# Patient Record
Sex: FEMALE | Race: WHITE | NOT HISPANIC OR LATINO | Employment: FULL TIME | URBAN - METROPOLITAN AREA
[De-identification: names, ages, dates, MRNs, and addresses within clinical notes are randomized per-mention and may not be internally consistent; named-entity substitution may affect disease eponyms.]

---

## 2022-11-16 PROBLEM — N92.0 EXCESSIVE OR FREQUENT MENSTRUATION: Status: ACTIVE | Noted: 2022-11-16

## 2022-11-16 PROBLEM — R10.2 PELVIC PAIN IN FEMALE: Status: ACTIVE | Noted: 2022-11-16

## 2024-11-08 ENCOUNTER — PATIENT MESSAGE (OUTPATIENT)
Dept: OTHER | Facility: OTHER | Age: 34
End: 2024-11-08
Payer: COMMERCIAL

## 2025-02-19 ENCOUNTER — PATIENT MESSAGE (OUTPATIENT)
Dept: INTERNAL MEDICINE | Facility: CLINIC | Age: 35
End: 2025-02-19

## 2025-02-19 ENCOUNTER — OFFICE VISIT (OUTPATIENT)
Dept: INTERNAL MEDICINE | Facility: CLINIC | Age: 35
End: 2025-02-19
Payer: COMMERCIAL

## 2025-02-19 DIAGNOSIS — E66.01 MORBID OBESITY WITH BODY MASS INDEX (BMI) GREATER THAN OR EQUAL TO 50: Primary | ICD-10-CM

## 2025-02-19 PROCEDURE — 99499 UNLISTED E&M SERVICE: CPT | Mod: 95,,,

## 2025-02-19 RX ORDER — SEMAGLUTIDE 0.5 MG/.5ML
0.5 INJECTION, SOLUTION SUBCUTANEOUS
Qty: 2 ML | Refills: 0 | Status: ACTIVE | OUTPATIENT
Start: 2025-02-19

## 2025-02-19 RX ORDER — SEMAGLUTIDE 0.25 MG/.5ML
0.25 INJECTION, SOLUTION SUBCUTANEOUS
Qty: 2 ML | Refills: 0 | Status: ACTIVE | OUTPATIENT
Start: 2025-02-19

## 2025-02-19 RX ORDER — SEMAGLUTIDE 1 MG/.5ML
1 INJECTION, SOLUTION SUBCUTANEOUS
Qty: 2 ML | Refills: 1 | Status: ACTIVE | OUTPATIENT
Start: 2025-02-19

## 2025-02-19 NOTE — PROGRESS NOTES
Patient ID: Melissa Zapien is a 34 y.o. White female    Subjective  Chief Complaint: patient presents for medical weight loss management.    Contraindications to GLP-1 receptor agonist therapy:   Denies personal or family history of MTC and personal history of MEN2     Pregnancy Status:   - Pt denies current pregnancy, breastfeeding, or plans to become pregnant.  - Pt denies current use of oral hormonal contraception.     Co-morbidities: none    History of weight loss therapy:  Pt reports prior use with Ozempic for 1-2 months with last dose being over a year ago. Pt endorsed overall tolerability.     Weight loss history:  Starting weight:    2/18/2025   Recent Readings    Weight (lbs) 307 lb    BMI 54.38 BMI        Objective  Lab Results   Component Value Date     03/17/2024     11/09/2022     (L) 11/30/2021     Lab Results   Component Value Date    K 4.4 03/17/2024    K 4.2 11/09/2022    K 3.2 (L) 11/30/2021     Lab Results   Component Value Date     11/09/2022     Lab Results   Component Value Date    CO2 24 03/17/2024    CO2 24 11/09/2022    CO2 20 (L) 11/30/2021     Lab Results   Component Value Date    BUN 11 03/17/2024    BUN 9 11/09/2022    BUN 14 11/30/2021     Lab Results   Component Value Date    GLU 88 11/09/2022     Lab Results   Component Value Date    CALCIUM 9.1 03/17/2024    CALCIUM 8.9 11/09/2022    CALCIUM 9.3 11/30/2021     Lab Results   Component Value Date    PROT 7 03/17/2024    PROT 7.3 11/09/2022    PROT 7.5 11/30/2021     Lab Results   Component Value Date    ALBUMIN 4.2 03/17/2024    ALBUMIN 4.1 11/09/2022    ALBUMIN 4 11/30/2021     Lab Results   Component Value Date    BILITOT 0.4 03/17/2024    BILITOT 0.6 11/09/2022    BILITOT 1.3 11/30/2021     Lab Results   Component Value Date    AST 23 03/17/2024    AST 27 11/09/2022    AST 62 (H) 11/30/2021     Lab Results   Component Value Date    ALT 30 03/17/2024    ALT 20 11/09/2022    ALT 72 (H) 11/30/2021     Lab  Results   Component Value Date    ANIONGAP 10 03/17/2024    ANIONGAP 12 11/09/2022    ANIONGAP 15 11/30/2021     Lab Results   Component Value Date    CREATININE 0.86 03/17/2024    CREATININE 0.70 11/09/2022    CREATININE 0.99 11/30/2021     Lab Results   Component Value Date    EGFRNORACEVR >60 11/09/2022     Assessment/Plan  -Pt qualifies for GLP-1 RA therapy based on BMI greater than or equal to 30 kg/m2  - Initiate Wegovy 0.25 mg once weekly for 1 month  - Then increase to Wegovy 0.5 mg once weekly for 1 month  - Then increase to Wegovy 1 mg once weekly  - RTC in 3 months for follow-up evaluation    Patient consented to pharmacist management via collaborative practice.

## 2025-02-26 PROBLEM — E66.813 OBESITY, CLASS III, BMI 40-49.9 (MORBID OBESITY): Status: ACTIVE | Noted: 2025-02-26

## 2025-02-26 PROBLEM — E66.01 OBESITY, CLASS III, BMI 40-49.9 (MORBID OBESITY): Status: ACTIVE | Noted: 2025-02-26

## 2025-05-07 ENCOUNTER — PATIENT MESSAGE (OUTPATIENT)
Dept: INTERNAL MEDICINE | Facility: CLINIC | Age: 35
End: 2025-05-07

## 2025-05-07 ENCOUNTER — OFFICE VISIT (OUTPATIENT)
Dept: INTERNAL MEDICINE | Facility: CLINIC | Age: 35
End: 2025-05-07
Payer: COMMERCIAL

## 2025-05-07 DIAGNOSIS — E66.01 MORBID OBESITY WITH BODY MASS INDEX (BMI) GREATER THAN OR EQUAL TO 50: Primary | ICD-10-CM

## 2025-05-07 PROCEDURE — 99499 UNLISTED E&M SERVICE: CPT | Mod: 95,,,

## 2025-05-07 RX ORDER — SEMAGLUTIDE 2.4 MG/.75ML
2.4 INJECTION, SOLUTION SUBCUTANEOUS
Qty: 3 ML | Refills: 1 | Status: ACTIVE | OUTPATIENT
Start: 2025-05-07

## 2025-05-07 RX ORDER — SEMAGLUTIDE 1.7 MG/.75ML
1.7 INJECTION, SOLUTION SUBCUTANEOUS
Qty: 3 ML | Refills: 0 | Status: ACTIVE | OUTPATIENT
Start: 2025-05-07

## 2025-08-06 ENCOUNTER — OFFICE VISIT (OUTPATIENT)
Dept: INTERNAL MEDICINE | Facility: CLINIC | Age: 35
End: 2025-08-06
Payer: COMMERCIAL

## 2025-08-06 ENCOUNTER — PATIENT MESSAGE (OUTPATIENT)
Dept: INTERNAL MEDICINE | Facility: CLINIC | Age: 35
End: 2025-08-06

## 2025-08-06 DIAGNOSIS — E66.813 OBESITY, CLASS III, BMI 40-49.9 (MORBID OBESITY): Primary | ICD-10-CM

## 2025-08-06 PROCEDURE — 99499 UNLISTED E&M SERVICE: CPT | Mod: 95,,,

## 2025-08-06 RX ORDER — SEMAGLUTIDE 2.4 MG/.75ML
2.4 INJECTION, SOLUTION SUBCUTANEOUS
Qty: 3 ML | Refills: 5 | Status: ACTIVE | OUTPATIENT
Start: 2025-08-06

## 2025-08-06 NOTE — PROGRESS NOTES
Patient ID: Melissa Zapien is a 34 y.o. White female    Subjective  Chief Complaint: patient presents for medical weight loss management.    Co-morbidities: none    HPI: Patient started [facility] with [facility] in February 2025 and is currently managed on Wegovy 2.4 mg. Pt previously tried Ozempic for 1-2 months.    Tolerance to current therapy:  Denies nausea, vomiting, diarrhea, constipation, abdominal pain    Weight loss history:  Starting weight:    2/18/2025   Recent Readings    Weight (lbs) 307 lb    BMI 54.38 BMI    Current weight:    7/31/2025   Recent Readings    Weight (lbs) 282 lb    BMI 49.95 BMI    % weight loss since GLP-1 initiation: 8.1 %    Objective  Lab Results   Component Value Date     03/17/2024     11/09/2022     (L) 11/30/2021     Lab Results   Component Value Date    K 4.4 03/17/2024    K 4.2 11/09/2022    K 3.2 (L) 11/30/2021     Lab Results   Component Value Date     11/09/2022     Lab Results   Component Value Date    CO2 24 03/17/2024    CO2 24 11/09/2022    CO2 20 (L) 11/30/2021     Lab Results   Component Value Date    BUN 11 03/17/2024    BUN 9 11/09/2022    BUN 14 11/30/2021     Lab Results   Component Value Date    GLU 88 11/09/2022     Lab Results   Component Value Date    CALCIUM 9.1 03/17/2024    CALCIUM 8.9 11/09/2022    CALCIUM 9.3 11/30/2021     Lab Results   Component Value Date    PROT 7 03/17/2024    PROT 7.3 11/09/2022    PROT 7.5 11/30/2021     Lab Results   Component Value Date    ALBUMIN 4.2 03/17/2024    ALBUMIN 4.1 11/09/2022    ALBUMIN 4 11/30/2021     Lab Results   Component Value Date    BILITOT 0.4 03/17/2024    BILITOT 0.6 11/09/2022    BILITOT 1.3 11/30/2021     Lab Results   Component Value Date    AST 23 03/17/2024    AST 27 11/09/2022    AST 62 (H) 11/30/2021     Lab Results   Component Value Date    ALT 30 03/17/2024    ALT 20 11/09/2022    ALT 72 (H) 11/30/2021     Lab Results   Component Value Date    ANIONGAP 10  03/17/2024    ANIONGAP 12 11/09/2022    ANIONGAP 15 11/30/2021     Lab Results   Component Value Date    CREATININE 0.86 03/17/2024    CREATININE 0.70 11/09/2022    CREATININE 0.99 11/30/2021     Lab Results   Component Value Date    EGFRNORACEVR >60 11/09/2022     Assessment/Plan  - Continue Wegovy 2.4 mg SQ weekly  - RTC in 6 months for follow-up evaluation    Patient consented to pharmacist management via collaborative practice.